# Patient Record
Sex: FEMALE | NOT HISPANIC OR LATINO | URBAN - METROPOLITAN AREA
[De-identification: names, ages, dates, MRNs, and addresses within clinical notes are randomized per-mention and may not be internally consistent; named-entity substitution may affect disease eponyms.]

---

## 2017-01-03 ENCOUNTER — COMMUNICATION - HEALTHEAST (OUTPATIENT)
Dept: FAMILY MEDICINE | Facility: CLINIC | Age: 62
End: 2017-01-03

## 2017-01-03 DIAGNOSIS — M79.7 FIBROMYALGIA: ICD-10-CM

## 2017-01-24 ENCOUNTER — COMMUNICATION - HEALTHEAST (OUTPATIENT)
Dept: FAMILY MEDICINE | Facility: CLINIC | Age: 62
End: 2017-01-24

## 2017-01-27 ENCOUNTER — COMMUNICATION - HEALTHEAST (OUTPATIENT)
Dept: FAMILY MEDICINE | Facility: CLINIC | Age: 62
End: 2017-01-27

## 2017-01-27 DIAGNOSIS — M79.7 FIBROMYALGIA: ICD-10-CM

## 2017-03-06 ENCOUNTER — COMMUNICATION - HEALTHEAST (OUTPATIENT)
Dept: ADMINISTRATIVE | Facility: CLINIC | Age: 62
End: 2017-03-06

## 2017-03-07 ENCOUNTER — COMMUNICATION - HEALTHEAST (OUTPATIENT)
Dept: FAMILY MEDICINE | Facility: CLINIC | Age: 62
End: 2017-03-07

## 2017-03-07 DIAGNOSIS — M79.7 FIBROMYALGIA: ICD-10-CM

## 2017-03-09 ENCOUNTER — COMMUNICATION - HEALTHEAST (OUTPATIENT)
Dept: FAMILY MEDICINE | Facility: CLINIC | Age: 62
End: 2017-03-09

## 2017-03-27 ENCOUNTER — AMBULATORY - HEALTHEAST (OUTPATIENT)
Dept: LAB | Facility: CLINIC | Age: 62
End: 2017-03-27

## 2017-03-27 DIAGNOSIS — M13.0 POLYARTHRITIS OF HAND: ICD-10-CM

## 2017-03-27 DIAGNOSIS — M25.50 ARTHRALGIA OF MULTIPLE SITES, BILATERAL: ICD-10-CM

## 2017-03-27 LAB
ALT SERPL W P-5'-P-CCNC: 19 U/L (ref 0–45)
CREAT SERPL-MCNC: 0.66 MG/DL (ref 0.6–1.1)
GFR SERPL CREATININE-BSD FRML MDRD: >60 ML/MIN/1.73M2

## 2017-03-29 ENCOUNTER — RECORDS - HEALTHEAST (OUTPATIENT)
Dept: GENERAL RADIOLOGY | Age: 62
End: 2017-03-29

## 2017-03-29 ENCOUNTER — OFFICE VISIT - HEALTHEAST (OUTPATIENT)
Dept: RHEUMATOLOGY | Facility: CLINIC | Age: 62
End: 2017-03-29

## 2017-03-29 ENCOUNTER — AMBULATORY - HEALTHEAST (OUTPATIENT)
Dept: RHEUMATOLOGY | Facility: CLINIC | Age: 62
End: 2017-03-29

## 2017-03-29 DIAGNOSIS — G89.29 CHRONIC PAIN OF LEFT KNEE: ICD-10-CM

## 2017-03-29 DIAGNOSIS — G89.29 OTHER CHRONIC PAIN: ICD-10-CM

## 2017-03-29 DIAGNOSIS — M25.562 CHRONIC PAIN OF LEFT KNEE: ICD-10-CM

## 2017-03-29 DIAGNOSIS — M13.0 POLYARTHRITIS OF HAND: ICD-10-CM

## 2017-03-29 DIAGNOSIS — M25.562 PAIN IN LEFT KNEE: ICD-10-CM

## 2017-03-29 DIAGNOSIS — M25.50 ARTHRALGIA OF MULTIPLE SITES, BILATERAL: ICD-10-CM

## 2017-03-29 ASSESSMENT — MIFFLIN-ST. JEOR: SCORE: 1221.28

## 2017-04-04 ENCOUNTER — COMMUNICATION - HEALTHEAST (OUTPATIENT)
Dept: FAMILY MEDICINE | Facility: CLINIC | Age: 62
End: 2017-04-04

## 2017-04-06 ENCOUNTER — RECORDS - HEALTHEAST (OUTPATIENT)
Dept: ADMINISTRATIVE | Facility: OTHER | Age: 62
End: 2017-04-06

## 2017-05-24 ENCOUNTER — RECORDS - HEALTHEAST (OUTPATIENT)
Dept: ADMINISTRATIVE | Facility: OTHER | Age: 62
End: 2017-05-24

## 2017-05-25 ENCOUNTER — COMMUNICATION - HEALTHEAST (OUTPATIENT)
Dept: RHEUMATOLOGY | Facility: CLINIC | Age: 62
End: 2017-05-25

## 2017-05-25 DIAGNOSIS — M25.50 ARTHRALGIA OF MULTIPLE SITES, BILATERAL: ICD-10-CM

## 2017-05-25 DIAGNOSIS — M13.0 POLYARTHRITIS OF HAND: ICD-10-CM

## 2017-05-31 ENCOUNTER — OFFICE VISIT - HEALTHEAST (OUTPATIENT)
Dept: RHEUMATOLOGY | Facility: CLINIC | Age: 62
End: 2017-05-31

## 2017-05-31 DIAGNOSIS — M06.00 SERONEGATIVE RHEUMATOID ARTHRITIS (H): ICD-10-CM

## 2017-05-31 DIAGNOSIS — R74.01 ALT (SGPT) LEVEL RAISED: ICD-10-CM

## 2017-05-31 DIAGNOSIS — M25.50 POLYARTHRALGIA: ICD-10-CM

## 2017-05-31 ASSESSMENT — MIFFLIN-ST. JEOR: SCORE: 1221.28

## 2017-06-01 ENCOUNTER — COMMUNICATION - HEALTHEAST (OUTPATIENT)
Dept: FAMILY MEDICINE | Facility: CLINIC | Age: 62
End: 2017-06-01

## 2017-06-01 DIAGNOSIS — E78.5 HYPERLIPIDEMIA: ICD-10-CM

## 2017-06-14 ENCOUNTER — RECORDS - HEALTHEAST (OUTPATIENT)
Dept: ADMINISTRATIVE | Facility: OTHER | Age: 62
End: 2017-06-14

## 2017-06-14 ENCOUNTER — COMMUNICATION - HEALTHEAST (OUTPATIENT)
Dept: ADMINISTRATIVE | Facility: CLINIC | Age: 62
End: 2017-06-14

## 2017-06-14 DIAGNOSIS — M06.00 SERONEGATIVE RHEUMATOID ARTHRITIS (H): ICD-10-CM

## 2017-06-14 DIAGNOSIS — R74.01 ALT (SGPT) LEVEL RAISED: ICD-10-CM

## 2017-07-17 ENCOUNTER — COMMUNICATION - HEALTHEAST (OUTPATIENT)
Dept: RHEUMATOLOGY | Facility: CLINIC | Age: 62
End: 2017-07-17

## 2017-07-17 DIAGNOSIS — M06.00 SERONEGATIVE RHEUMATOID ARTHRITIS (H): ICD-10-CM

## 2017-07-17 DIAGNOSIS — M25.50 ARTHRALGIA OF MULTIPLE SITES, BILATERAL: ICD-10-CM

## 2017-07-17 DIAGNOSIS — M13.0 POLYARTHRITIS OF HAND: ICD-10-CM

## 2017-07-21 ENCOUNTER — RECORDS - HEALTHEAST (OUTPATIENT)
Dept: ADMINISTRATIVE | Facility: OTHER | Age: 62
End: 2017-07-21

## 2017-07-28 ENCOUNTER — RECORDS - HEALTHEAST (OUTPATIENT)
Dept: ADMINISTRATIVE | Facility: OTHER | Age: 62
End: 2017-07-28

## 2017-08-02 ENCOUNTER — COMMUNICATION - HEALTHEAST (OUTPATIENT)
Dept: RHEUMATOLOGY | Facility: CLINIC | Age: 62
End: 2017-08-02

## 2017-08-02 ENCOUNTER — OFFICE VISIT - HEALTHEAST (OUTPATIENT)
Dept: RHEUMATOLOGY | Facility: CLINIC | Age: 62
End: 2017-08-02

## 2017-08-02 DIAGNOSIS — M06.00 SERONEGATIVE RHEUMATOID ARTHRITIS (H): ICD-10-CM

## 2017-08-02 DIAGNOSIS — M25.50 ARTHRALGIA OF MULTIPLE SITES, BILATERAL: ICD-10-CM

## 2017-08-02 DIAGNOSIS — M25.562 CHRONIC PAIN OF LEFT KNEE: ICD-10-CM

## 2017-08-02 DIAGNOSIS — G89.29 CHRONIC PAIN OF LEFT KNEE: ICD-10-CM

## 2017-08-29 ENCOUNTER — RECORDS - HEALTHEAST (OUTPATIENT)
Dept: ADMINISTRATIVE | Facility: OTHER | Age: 62
End: 2017-08-29

## 2017-09-19 ENCOUNTER — COMMUNICATION - HEALTHEAST (OUTPATIENT)
Dept: RHEUMATOLOGY | Facility: CLINIC | Age: 62
End: 2017-09-19

## 2017-09-19 DIAGNOSIS — M13.0 POLYARTHRITIS OF HAND: ICD-10-CM

## 2017-10-03 ENCOUNTER — COMMUNICATION - HEALTHEAST (OUTPATIENT)
Dept: ADMINISTRATIVE | Facility: CLINIC | Age: 62
End: 2017-10-03

## 2017-10-03 DIAGNOSIS — M06.00 SERONEGATIVE RHEUMATOID ARTHRITIS (H): ICD-10-CM

## 2017-10-03 DIAGNOSIS — M25.50 ARTHRALGIA OF MULTIPLE SITES, BILATERAL: ICD-10-CM

## 2017-10-05 ENCOUNTER — COMMUNICATION - HEALTHEAST (OUTPATIENT)
Dept: RHEUMATOLOGY | Facility: CLINIC | Age: 62
End: 2017-10-05

## 2017-10-05 DIAGNOSIS — M13.0 POLYARTHRITIS OF HAND: ICD-10-CM

## 2017-10-05 DIAGNOSIS — M25.50 ARTHRALGIA OF MULTIPLE SITES, BILATERAL: ICD-10-CM

## 2017-10-20 ENCOUNTER — RECORDS - HEALTHEAST (OUTPATIENT)
Dept: ADMINISTRATIVE | Facility: OTHER | Age: 62
End: 2017-10-20

## 2017-11-08 ENCOUNTER — OFFICE VISIT - HEALTHEAST (OUTPATIENT)
Dept: RHEUMATOLOGY | Facility: CLINIC | Age: 62
End: 2017-11-08

## 2017-11-08 DIAGNOSIS — M06.00 SERONEGATIVE RHEUMATOID ARTHRITIS (H): ICD-10-CM

## 2017-11-08 DIAGNOSIS — R74.01 ALT (SGPT) LEVEL RAISED: ICD-10-CM

## 2017-11-08 DIAGNOSIS — M13.0 POLYARTHRITIS OF HAND: ICD-10-CM

## 2017-11-08 ASSESSMENT — MIFFLIN-ST. JEOR: SCORE: 1212.2

## 2017-11-18 ENCOUNTER — COMMUNICATION - HEALTHEAST (OUTPATIENT)
Dept: FAMILY MEDICINE | Facility: CLINIC | Age: 62
End: 2017-11-18

## 2017-11-18 DIAGNOSIS — M79.7 FIBROMYALGIA: ICD-10-CM

## 2017-11-19 ENCOUNTER — COMMUNICATION - HEALTHEAST (OUTPATIENT)
Dept: FAMILY MEDICINE | Facility: CLINIC | Age: 62
End: 2017-11-19

## 2017-11-19 DIAGNOSIS — E78.5 HYPERLIPIDEMIA: ICD-10-CM

## 2017-11-23 ENCOUNTER — AMBULATORY - HEALTHEAST (OUTPATIENT)
Dept: FAMILY MEDICINE | Facility: CLINIC | Age: 62
End: 2017-11-23

## 2017-11-23 DIAGNOSIS — Z12.31 VISIT FOR SCREENING MAMMOGRAM: ICD-10-CM

## 2017-11-23 DIAGNOSIS — Z12.11 SCREENING FOR COLON CANCER: ICD-10-CM

## 2017-12-11 ENCOUNTER — RECORDS - HEALTHEAST (OUTPATIENT)
Dept: ADMINISTRATIVE | Facility: OTHER | Age: 62
End: 2017-12-11

## 2017-12-12 ENCOUNTER — COMMUNICATION - HEALTHEAST (OUTPATIENT)
Dept: FAMILY MEDICINE | Facility: CLINIC | Age: 62
End: 2017-12-12

## 2017-12-12 DIAGNOSIS — E78.5 HYPERLIPIDEMIA: ICD-10-CM

## 2017-12-16 ENCOUNTER — COMMUNICATION - HEALTHEAST (OUTPATIENT)
Dept: FAMILY MEDICINE | Facility: CLINIC | Age: 62
End: 2017-12-16

## 2017-12-16 DIAGNOSIS — I10 ESSENTIAL HYPERTENSION: ICD-10-CM

## 2018-01-12 ENCOUNTER — COMMUNICATION - HEALTHEAST (OUTPATIENT)
Dept: FAMILY MEDICINE | Facility: CLINIC | Age: 63
End: 2018-01-12

## 2018-01-12 DIAGNOSIS — M79.7 FIBROMYALGIA: ICD-10-CM

## 2018-02-09 ENCOUNTER — COMMUNICATION - HEALTHEAST (OUTPATIENT)
Dept: FAMILY MEDICINE | Facility: CLINIC | Age: 63
End: 2018-02-09

## 2018-02-09 DIAGNOSIS — M79.7 FIBROMYALGIA: ICD-10-CM

## 2018-03-21 ENCOUNTER — COMMUNICATION - HEALTHEAST (OUTPATIENT)
Dept: ADMINISTRATIVE | Facility: CLINIC | Age: 63
End: 2018-03-21

## 2018-03-21 DIAGNOSIS — M06.00 SERONEGATIVE RHEUMATOID ARTHRITIS (H): ICD-10-CM

## 2018-03-21 DIAGNOSIS — M13.0 POLYARTHRITIS OF HAND: ICD-10-CM

## 2018-03-28 ENCOUNTER — COMMUNICATION - HEALTHEAST (OUTPATIENT)
Dept: FAMILY MEDICINE | Facility: CLINIC | Age: 63
End: 2018-03-28

## 2018-03-28 ENCOUNTER — RECORDS - HEALTHEAST (OUTPATIENT)
Dept: ADMINISTRATIVE | Facility: OTHER | Age: 63
End: 2018-03-28

## 2021-05-28 ENCOUNTER — RECORDS - HEALTHEAST (OUTPATIENT)
Dept: ADMINISTRATIVE | Facility: CLINIC | Age: 66
End: 2021-05-28

## 2021-05-29 ENCOUNTER — RECORDS - HEALTHEAST (OUTPATIENT)
Dept: ADMINISTRATIVE | Facility: CLINIC | Age: 66
End: 2021-05-29

## 2021-05-30 ENCOUNTER — RECORDS - HEALTHEAST (OUTPATIENT)
Dept: ADMINISTRATIVE | Facility: CLINIC | Age: 66
End: 2021-05-30

## 2021-05-30 VITALS — BODY MASS INDEX: 24.99 KG/M2 | HEIGHT: 65 IN | WEIGHT: 150 LBS

## 2021-05-31 ENCOUNTER — RECORDS - HEALTHEAST (OUTPATIENT)
Dept: ADMINISTRATIVE | Facility: CLINIC | Age: 66
End: 2021-05-31

## 2021-05-31 VITALS — HEIGHT: 65 IN | BODY MASS INDEX: 24.99 KG/M2 | WEIGHT: 150 LBS

## 2021-05-31 VITALS — HEIGHT: 65 IN | WEIGHT: 148 LBS | BODY MASS INDEX: 24.66 KG/M2

## 2021-05-31 VITALS — BODY MASS INDEX: 24.63 KG/M2 | WEIGHT: 148 LBS

## 2021-06-09 NOTE — PROGRESS NOTES
"ASSESSMENT AND PLAN:  Rachael Robertson 61 y.o. female is in for follow-up for osteoarthritis.  She has ample evidence of inflammatory arthropathy.  She has tolerated methotrexate.  Increase the dose to 20 mg.  Taper prednisone.  Management principles of osteoarthritis reviewed.  Return for follow-up in 2 months labs every 4 week.    Diagnoses and all orders for this visit:    Polyarthritis of hand  -     predniSONE (DELTASONE) 2.5 MG tablet; Take 7.5 mg/d prednisone PO for 1 month, reduce to 5 mg/d for the next month, and then reduce to 2.5 mg/d for the third month and then stop.  Dispense: 90 tablet; Refill: 2  -     methotrexate 2.5 MG tablet; Take 8 tablets (20 mg total) by mouth once a week.  Dispense: 32 tablet; Refill: 1  -     folic acid (FOLVITE) 1 MG tablet; Take 1 tablet (1 mg total) by mouth daily.  Dispense: 30 tablet; Refill: 11  -     XR Knee Left 1 or 2 VWS; Future; Expected date: 3/29/17    Arthralgia of multiple sites, bilateral  -     methotrexate 2.5 MG tablet; Take 8 tablets (20 mg total) by mouth once a week.  Dispense: 32 tablet; Refill: 1    Chronic pain of left knee  -     XR Knee Left 1 or 2 VWS; Future; Expected date: 3/29/17    HISTORY OF PRESENTING ILLNESS:  Rachael Robertson 61 y.o. female is here for follow-up of seronegative inflammatory polyarthritis.  She has begun to tolerate the current treatment regimen.  Her pain is improved compared to the previous visit.  She has residual symptoms.  We talked about the side effects.  She has noted:none.  Recent labs reviewed: Within acceptable range.  There are no associated new symptoms.  Inquiry into the extra-articular features reveals: None .he past 5 years.  Progressive.  Intermittently she gets swollen hands and fingers especially pointing to the PIPs and MCPs but more so on the left side.  She then had a fall.  Injured her left second MCP area.  She was operated on by orthopedics and had \"synovial tissue removed: Normal.  She reports that " "ibuprofen has done some help for her.  She is finding it increasingly difficult to do such activities such as buttoning her grandchildren.  She has difficult time taking her rings off especially in the mornings and there is puffiness around the PIPs.  In addition she has as she puts several other joints which have been troublesome.  She had an injury and resulted in subscapularis tendon evulsion on the left side.  She has rotator cuff damage to the right side and been told that her remedy is a reverse arthroplasty and.  She had her right knee replaced in 2008.  She has had 2 lumbar surgeries for disc disease.  She is a nonsmoker alcohol couple drinks per week.  In 1996 recalls she was seen at [facility] fibromyalgia was diagnosed she's been on current medication including duloxetine since a long time.  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  She has strong family history of rheumatoid arthritis in mother and sister.  Sister has gout as well.  She rates her pain as \"severe\" a 0.0/10.  She noted ongoing back pain yet.  As she does not have history of psoriasis ulcerative colitis or Crohn's disease.  Besides the right knee surgery she has had right shoulder surgery for rotator cuff, and this was in 2011 and then in 2013.  2016 left subscapularis detachment surgery was done.  She had left foot surgery for \"crooked\".  Recently she injured her left ring finger hyperextending it.  She has hypertension and history of cataracts.  For further historical information including review of systems, ADL limitations please review the assessment/plan section and the multidimensional health assessment questionnaire from today's visit which is reviewed and attached in the EMR.    ALLERGIES:Review of patient's allergies indicates no known allergies.    PAST MEDICAL/ACTIVE PROBLEMS/MEDICATION/ FAMILY HISTORY/SOCIAL DATA:  The patient has a family history of  No past medical " "history on file.  History   Smoking Status     Never Smoker   Smokeless Tobacco     Never Used     Patient Active Problem List   Diagnosis     Osteopenia     Massive Tear Of The Right Rotator Cuff Tendon, s/p repair 9/30/16     Hypertension     Fibromyalgia     Hyperlipidemia     Allergic rhinitis     Arthralgia of multiple sites, bilateral     Polyarthritis of hand     Postcoital UTI     Current Outpatient Prescriptions   Medication Sig Dispense Refill     DULoxetine (CYMBALTA) 60 MG capsule TAKE 1 CAPSULE (60 MG TOTAL) BY MOUTH DAILY. 90 capsule 3     gabapentin (NEURONTIN) 300 MG capsule TAKE ONE CAPSULE BY MOUTH DAILY AT BEDTIME 90 capsule 2     hydroCHLOROthiazide (HYDRODIURIL) 25 MG tablet TAKE 1 TABLET (25 MG TOTAL) BY MOUTH DAILY. 90 tablet 3     oxyCODONE (ROXICODONE) 5 MG immediate release tablet   0     pilocarpine (SALAGEN) 2.5 mg Take 10 mg by mouth daily.       RESTASIS 0.05 % ophthalmic emulsion   1     simvastatin (ZOCOR) 20 MG tablet TAKE 1 TABLET (20 MG TOTAL) BY MOUTH BEDTIME. 90 tablet 3     sulfamethoxazole-trimethoprim (BACTRIM DS) 800-160 mg per tablet Take one tablet as needed for postcoital UTI prevention 30 tablet 0     methotrexate 2.5 MG tablet Take 4 tablets (10 mg total) by mouth once a week. 16 tablet 0     No current facility-administered medications for this visit.      DETAILED EXAMINATION (six area) :  Vitals:    03/29/17 1111   BP: 108/58   Weight: 150 lb (68 kg)   Height: 5' 5\" (1.651 m)     Alert oriented. Head including the face is examined for malar rash, heliotropes, scarring, lupus pernio. Eyes examined for redness such as in episcleritis/scleritis, periorbital lesions.   Neck examined  for lymph nodes, range of motion Both upper and lower extremities (all four) examined for swollen, warm &/or  tender joints, range of motion, rash, muscle weakness, edema. The salient normal / abnormal findings are appended.      On examination she has residual swelling of the MCPs especially " the #2 on left hand without tenderness.     LAB / IMAGING DATA:  ALT   Date Value Ref Range Status   03/27/2017 19 0 - 45 U/L Final   07/28/2016 23 0 - 45 U/L Final   05/29/2015 33 0 - 45 U/L Final     Albumin   Date Value Ref Range Status   03/27/2017 4.5 3.5 - 5.0 g/dL Final   07/28/2016 4.2 3.5 - 5.0 g/dL Final   05/29/2015 4.1 3.5 - 5.0 g/dL Final     Creatinine   Date Value Ref Range Status   03/27/2017 0.66 0.60 - 1.10 mg/dL Final   09/19/2016 0.76 0.60 - 1.10 mg/dL Final   07/28/2016 0.73 0.60 - 1.10 mg/dL Final       WBC   Date Value Ref Range Status   03/27/2017 6.7 4.0 - 11.0 thou/uL Final   10/17/2016 5.7 4.0 - 11.0 thou/uL Final   04/02/2015 4.7 4.0 - 11.0 thou/uL Final   09/09/2014 4.1 4.0 - 11.0 thou/uL Final     Hemoglobin   Date Value Ref Range Status   03/27/2017 13.8 12.0 - 16.0 g/dL Final   10/17/2016 12.6 12.0 - 16.0 g/dL Final   09/19/2016 13.9 12.0 - 16.0 g/dL Final     Platelets   Date Value Ref Range Status   03/27/2017 373 140 - 440 thou/uL Final   10/17/2016 399 140 - 440 thou/uL Final   09/19/2016 363 140 - 440 thou/uL Final       Lab Results   Component Value Date    RF 19.8 07/28/2016    SEDRATE 12 07/28/2016

## 2021-06-11 NOTE — PROGRESS NOTES
ASSESSMENT AND PLAN:  Rachael Robertson 61 y.o. female is in for follow-up for inflammatory polyarthritis which is now requiring a morphology of seronegative rheumatoid arthritis in the background of osteoarthritis.  While she feels things have improved with methotrexate 8 tablets per week which she splits for in the morning for in the evening, with folic acid she does have residual inflammation such as a synovitis in the left hand particularly the second MCP.  Furthermore her recent labs done in [place] show that her liver function studies have gotten worse.  On 427 she had an ALT of 37 which was elevated part that labs reference, and now 93 on 524.  I am unable to add sulfasalazine as I had planned.  One could even have to consider starting reducing the methotrexate.  We will need to modify Replens.  She is going to stay on prednisone 5 mg instead of going to 2.5.  We will check her labs today.  If these are better/within acceptable range continue methotrexate and add sulfasalazine in which case she will check her labs every 2 weeks ×2.  If on the other hand the liver functions have deteriorated she may have to hold methotrexate and recheck the labs and also consider tumor necrosis factor inhibitors.    Diagnoses and all orders for this visit:    Seronegative rheumatoid arthritis  -     predniSONE (DELTASONE) 5 MG tablet; Take 1 tablet (5 mg total) by mouth daily.  Dispense: 30 tablet; Refill: 1  -     QTF-Mycobacterium tuberculosis by [facility]-TB Gold    ALT (SGPT) level raised    Polyarthralgia      HISTORY OF PRESENTING ILLNESS:  Rachael Robertson 61 y.o. female is here for follow-up of seronegative rheumatoid arthritis.  She has moderately severe pain such as in her shoulders especially the right side where she had arthroplasty.  She noted milder pain in her hands.  She noted overall pain level 5.0/10.  This is interfering with a variety of day-to-day activities.  There is little in the way of morning stiffness.   "She has been able to tolerate methotrexate nicely.  She is down to 5 mg of prednisone changing over to 2.5 starting tomorrow.  Recent labs were done elsewhere and a copy is attached along with the chart the ALT is worsening now at 93, previously 37 which is also noted to be high for that lab.  Continue do think is currently swollen is to take something with you she has begun to tolerate the current treatment regimen.  Her pain is improved compared to the previous visit.  She has residual symptoms.  We talked about the side effects.  She has noted:none.  Recent labs reviewed: Within acceptable range.  There are no associated new symptoms.  Inquiry into the extra-articular features reveals: None .he past 5 years.  Progressive.  Intermittently she gets swollen hands and fingers especially pointing to the PIPs and MCPs but more so on the left side.  She then had a fall.  Injured her left second MCP area.  She was operated on by orthopedics and had \"synovial tissue removed: Normal.  She reports that ibuprofen has done some help for her.  She is finding it increasingly difficult to do such activities such as buttoning her grandchildren.  She has difficult time taking her rings off especially in the mornings and there is puffiness around the PIPs.  In addition she has as she puts several other joints which have been troublesome.  She had an injury and resulted in subscapularis tendon evulsion on the left side.  She has rotator cuff damage to the right side and been told that her remedy is a reverse arthroplasty and.  She had her right knee replaced in 2008.  She has had 2 lumbar surgeries for disc disease.  She is a nonsmoker alcohol couple drinks per week.  In 1996 recalls she was seen at [facility] fibromyalgia was diagnosed she's been on current medication including duloxetine since a long time.  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  She has strong family " "history of rheumatoid arthritis in mother and sister.  Sister has gout as well.  She rates her pain as \"severe\" a 0.0/10.  She noted ongoing back pain yet.  As she does not have history of psoriasis ulcerative colitis or Crohn's disease.  Besides the right knee surgery she has had right shoulder surgery for rotator cuff, and this was in 2011 and then in 2013.  2016 left subscapularis detachment surgery was done.  She had left foot surgery for \"crooked\".  Recently she injured her left ring finger hyperextending it.  She has hypertension and history of cataracts.  For further historical information including review of systems, ADL limitations please review the assessment/plan section and the multidimensional health assessment questionnaire from today's visit which is reviewed and attached in the EMR.    ALLERGIES:Review of patient's allergies indicates no known allergies.    PAST MEDICAL/ACTIVE PROBLEMS/MEDICATION/ FAMILY HISTORY/SOCIAL DATA:  The patient has a family history of  No past medical history on file.  History   Smoking Status     Never Smoker   Smokeless Tobacco     Never Used     Patient Active Problem List   Diagnosis     Osteopenia     Massive Tear Of The Right Rotator Cuff Tendon, s/p repair 9/30/16     Hypertension     Fibromyalgia     Hyperlipidemia     Allergic rhinitis     Arthralgia of multiple sites, bilateral     Polyarthritis of hand     Postcoital UTI     Chronic pain of left knee     Current Outpatient Prescriptions   Medication Sig Dispense Refill     DULoxetine (CYMBALTA) 60 MG capsule TAKE 1 CAPSULE (60 MG TOTAL) BY MOUTH DAILY. 90 capsule 3     folic acid (FOLVITE) 1 MG tablet Take 1 tablet (1 mg total) by mouth daily. 30 tablet 11     gabapentin (NEURONTIN) 300 MG capsule TAKE ONE CAPSULE BY MOUTH DAILY AT BEDTIME 90 capsule 2     hydroCHLOROthiazide (HYDRODIURIL) 25 MG tablet TAKE 1 TABLET (25 MG TOTAL) BY MOUTH DAILY. 90 tablet 3     methotrexate 2.5 MG tablet TAKE 8 TABLETS (20 MG " "TOTAL) BY MOUTH ONCE A WEEK. 32 tablet 1     oxyCODONE (ROXICODONE) 5 MG immediate release tablet   0     pilocarpine (SALAGEN) 2.5 mg Take 10 mg by mouth daily.       predniSONE (DELTASONE) 2.5 MG tablet Take 7.5 mg/d prednisone PO for 1 month, reduce to 5 mg/d for the next month, and then reduce to 2.5 mg/d for the third month and then stop. 90 tablet 2     RESTASIS 0.05 % ophthalmic emulsion   1     simvastatin (ZOCOR) 20 MG tablet TAKE 1 TABLET (20 MG TOTAL) BY MOUTH BEDTIME. 90 tablet 3     sulfamethoxazole-trimethoprim (BACTRIM DS) 800-160 mg per tablet Take one tablet as needed for postcoital UTI prevention 30 tablet 0     No current facility-administered medications for this visit.      DETAILED EXAMINATION (six area) :  Vitals:    05/31/17 1526   BP: 100/58   Patient Site: Right Arm   Patient Position: Sitting   Cuff Size: Adult Regular   Pulse: 72   Weight: 150 lb (68 kg)   Height: 5' 5\" (1.651 m)     Alert oriented. Head including the face is examined for malar rash, heliotropes, scarring, lupus pernio. Eyes examined for redness such as in episcleritis/scleritis, periorbital lesions.   Neck examined  for lymph nodes, range of motion Both upper and lower extremities (all four) examined for swollen, warm &/or  tender joints, range of motion, rash, muscle weakness, edema. The salient normal / abnormal findings are appended.  She has persistent swelling and tenderness of her left hand small joints especially the left second MCP, she now has a boutonniere deformity of the left index finger.       LAB / IMAGING DATA:  ALT   Date Value Ref Range Status   03/27/2017 19 0 - 45 U/L Final   07/28/2016 23 0 - 45 U/L Final   05/29/2015 33 0 - 45 U/L Final     Albumin   Date Value Ref Range Status   03/27/2017 4.5 3.5 - 5.0 g/dL Final   07/28/2016 4.2 3.5 - 5.0 g/dL Final   05/29/2015 4.1 3.5 - 5.0 g/dL Final     Creatinine   Date Value Ref Range Status   03/27/2017 0.66 0.60 - 1.10 mg/dL Final   09/19/2016 0.76 0.60 - " 1.10 mg/dL Final   07/28/2016 0.73 0.60 - 1.10 mg/dL Final       WBC   Date Value Ref Range Status   03/27/2017 6.7 4.0 - 11.0 thou/uL Final   10/17/2016 5.7 4.0 - 11.0 thou/uL Final   04/02/2015 4.7 4.0 - 11.0 thou/uL Final   09/09/2014 4.1 4.0 - 11.0 thou/uL Final     Hemoglobin   Date Value Ref Range Status   03/27/2017 13.8 12.0 - 16.0 g/dL Final   10/17/2016 12.6 12.0 - 16.0 g/dL Final   09/19/2016 13.9 12.0 - 16.0 g/dL Final     Platelets   Date Value Ref Range Status   03/27/2017 373 140 - 440 thou/uL Final   10/17/2016 399 140 - 440 thou/uL Final   09/19/2016 363 140 - 440 thou/uL Final       Lab Results   Component Value Date    RF 19.8 07/28/2016    SEDRATE 12 07/28/2016

## 2021-06-12 NOTE — PROGRESS NOTES
ASSESSMENT AND PLAN:  Rachael Robertson 61 y.o. female is here for follow-up of seronegative rheumatoid arthritis, osteoarthritis, history of marked abnormalities and liver functions studies.  These are now normal.  This was done at lab elsewhere copy attached in the chart.  She has ongoing inflammation.  This is most prominent in her left hand epicentered at the second metacarpophalangeal joints.  We talked about possibility of local injection she wants to proceed 10 mg of methylprednisolone and Marcaine under ultrasound guidance injected.  On a previous visit we could not add further medication because of the liver issues that those as noted resolved.  I have asked her to add sulfasalazine 500 mg twice daily and gradually increase it to 1.5 g twice daily.  Prescription for this is given.  This is going to be in addition to the methotrexate.  She is on folic acid.  I have asked her to return for follow-up here at this time in 3 months labs every 4 week.  Once she stabilizes will then do labs at less frequent intervals.      Diagnoses and all orders for this visit:    Seronegative rheumatoid arthritis  -     sulfaSALAzine (AZULFIDINE) 500 mg tablet; 500 mg twice daily for 10 days, increase to a gram twice daily for the next 10 days, and then 1.5 mg twice daily.  Dispense: 180 tablet; Refill: 1  -     methylPREDNISolone acetate injection 10 mg (DEPO-MEDROL); Inject 0.25 mL (10 mg total) into the joint once.    Arthralgia of multiple sites, bilateral  -     sulfaSALAzine (AZULFIDINE) 500 mg tablet; 500 mg twice daily for 10 days, increase to a gram twice daily for the next 10 days, and then 1.5 mg twice daily.  Dispense: 180 tablet; Refill: 1  -     methylPREDNISolone acetate injection 10 mg (DEPO-MEDROL); Inject 0.25 mL (10 mg total) into the joint once.    Chronic pain of left knee      HISTORY OF PRESENTING ILLNESS:  Rachael Robertson 61 y.o. female is here for follow-up of seronegative rheumatoid arthritis.  She has  "moderately severe pain such as in her left hand.  She rates it at 8.0/10.  This is epicentered of the left index finger metacarpophalangeal joint.  Her knee pain has resolved nearly completely with corticosteroid injection.  She had marked elevation of liver function studies which have not improved back to normal now.  Recent labs reviewed these were done elsewhere.  Copy attached in the chart.  This is interfering with a variety of day-to-day activities.  T her morning stiffness is 2-3 hours.  She has been able to tolerate methotrexate nicely.  She is on 2.5 mg of prednisone which she is going to discontinue after a month.      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  She has strong family history of rheumatoid arthritis in mother and sister.  Sister has gout as well.  As she does not have history of psoriasis ulcerative colitis or Crohn's disease.  Besides the right knee surgery she has had right shoulder surgery for rotator cuff, and this was in 2011 and then in 2013.  2016 left subscapularis detachment surgery was done.  She had left foot surgery for \"crooked\".  Recently she injured her left ring finger hyperextending it.  She has hypertension and history of cataracts.  For further historical information including review of systems, ADL limitations please review the assessment/plan section and the multidimensional health assessment questionnaire from today's visit which is reviewed and attached in the EMR.          Following is the excerpt from a recent note: Recent labs were done elsewhere and a copy is attached along with the chart the ALT is worsening now at 93, previously 37 which is also noted to be high for that lab.  Continue do think is currently swollen is to take something with you she has begun to tolerate the current treatment regimen.  Her pain is improved compared to the previous visit.  She has residual symptoms.  We talked about the side " "effects.  She has noted:none.  Recent labs reviewed: Within acceptable range.  There are no associated new symptoms.  Inquiry into the extra-articular features reveals: None .he past 5 years.  Progressive.  Intermittently she gets swollen hands and fingers especially pointing to the PIPs and MCPs but more so on the left side.  She then had a fall.  Injured her left second MCP area.  She was operated on by orthopedics and had \"synovial tissue removed: Normal.  She reports that ibuprofen has done some help for her.  She is finding it increasingly difficult to do such activities such as buttoning her grandchildren.  She has difficult time taking her rings off especially in the mornings and there is puffiness around the PIPs.  In addition she has as she puts several other joints which have been troublesome.  She had an injury and resulted in subscapularis tendon evulsion on the left side.  She has rotator cuff damage to the right side and been told that her remedy is a reverse arthroplasty and.  She had her right knee replaced in 2008.  She has had 2 lumbar surgeries for disc disease.  She is a nonsmoker alcohol couple drinks per week.  In 1996 recalls she was seen at [facility] fibromyalgia was diagnosed she's been on current medication including duloxetine since a long time.ALLERGIES:Review of patient's allergies indicates no known allergies.    PAST MEDICAL/ACTIVE PROBLEMS/MEDICATION/ FAMILY HISTORY/SOCIAL DATA:  The patient has a family history of  No past medical history on file.  History   Smoking Status     Never Smoker   Smokeless Tobacco     Never Used     Patient Active Problem List   Diagnosis     Osteopenia     Massive Tear Of The Right Rotator Cuff Tendon, s/p repair 9/30/16     Hypertension     Fibromyalgia     Hyperlipidemia     Allergic rhinitis     Arthralgia of multiple sites, bilateral     Polyarthritis of hand     Postcoital UTI     Chronic pain of left knee     Seronegative rheumatoid arthritis     " ALT (SGPT) level raised     Polyarthralgia     Current Outpatient Prescriptions   Medication Sig Dispense Refill     DULoxetine (CYMBALTA) 60 MG capsule TAKE 1 CAPSULE (60 MG TOTAL) BY MOUTH DAILY. 90 capsule 3     folic acid (FOLVITE) 1 MG tablet Take 1 tablet (1 mg total) by mouth daily. 30 tablet 11     gabapentin (NEURONTIN) 300 MG capsule TAKE ONE CAPSULE BY MOUTH DAILY AT BEDTIME 90 capsule 2     hydroCHLOROthiazide (HYDRODIURIL) 25 MG tablet TAKE 1 TABLET (25 MG TOTAL) BY MOUTH DAILY. 90 tablet 3     methotrexate 2.5 MG tablet Take 8 tablets (20 mg total) by mouth once a week. 32 tablet 1     oxyCODONE (ROXICODONE) 5 MG immediate release tablet   0     pilocarpine (SALAGEN) 2.5 mg Take 10 mg by mouth daily.       RESTASIS 0.05 % ophthalmic emulsion   1     simvastatin (ZOCOR) 20 MG tablet TAKE 1 TABLET (20 MG TOTAL) BY MOUTH AT BEDTIME. 90 tablet 1     sulfamethoxazole-trimethoprim (BACTRIM DS) 800-160 mg per tablet Take one tablet as needed for postcoital UTI prevention 30 tablet 0     No current facility-administered medications for this visit.      DETAILED EXAMINATION (six area) :  There were no vitals filed for this visit.  Alert oriented. Head including the face is examined for malar rash, heliotropes, scarring, lupus pernio. Eyes examined for redness such as in episcleritis/scleritis, periorbital lesions.   Neck examined  for lymph nodes, range of motion Both upper and lower extremities (all four) examined for swollen, warm &/or  tender joints, range of motion, rash, muscle weakness, edema. The salient normal / abnormal findings are appended.  She has synovitis of the left second metacarpophalangeal joint with swelling tenderness and warmth.  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  Mild JLT of the knees.     LAB / IMAGING DATA:  ALT   Date Value Ref Range Status   03/27/2017 19 0 - 45 U/L Final   07/28/2016 23 0 - 45 U/L Final   05/29/2015 33 0 - 45 U/L Final     Albumin   Date Value  Ref Range Status   03/27/2017 4.5 3.5 - 5.0 g/dL Final   07/28/2016 4.2 3.5 - 5.0 g/dL Final   05/29/2015 4.1 3.5 - 5.0 g/dL Final     Creatinine   Date Value Ref Range Status   03/27/2017 0.66 0.60 - 1.10 mg/dL Final   09/19/2016 0.76 0.60 - 1.10 mg/dL Final   07/28/2016 0.73 0.60 - 1.10 mg/dL Final       WBC   Date Value Ref Range Status   03/27/2017 6.7 4.0 - 11.0 thou/uL Final   10/17/2016 5.7 4.0 - 11.0 thou/uL Final   04/02/2015 4.7 4.0 - 11.0 thou/uL Final   09/09/2014 4.1 4.0 - 11.0 thou/uL Final     Hemoglobin   Date Value Ref Range Status   03/27/2017 13.8 12.0 - 16.0 g/dL Final   10/17/2016 12.6 12.0 - 16.0 g/dL Final   09/19/2016 13.9 12.0 - 16.0 g/dL Final     Platelets   Date Value Ref Range Status   03/27/2017 373 140 - 440 thou/uL Final   10/17/2016 399 140 - 440 thou/uL Final   09/19/2016 363 140 - 440 thou/uL Final       Lab Results   Component Value Date    RF 19.8 07/28/2016    SEDRATE 12 07/28/2016

## 2021-06-14 NOTE — PROGRESS NOTES
"ASSESSMENT AND PLAN:  Rachael Robertson 62 y.o. female has seronegative rheumatoid arthritis, osteoarthritis, persistently abnormal liver function studies.  I have asked her to discontinue methotrexate and sulfasalazine.  She brings the labs that were drawn in [place].  Her insurance does not cover care in [place].  She had wondered if she continued to follow-up here.  I have outlined to her that given how she is going to require multiple visits and indeed an MRI of her left hand today was recommended, it may be a better option for her to consider having a rheumatologist locally in [place].  Therefore the MRI of the left hand is been canceled.  To help \"bridge\" her over the next few weeks and low-dose prednisone is been prescribed.  She may well not be a candidate for Biologics given recurrent persistent liver function issues.  We discussed how those can be associated with the hepatic toxicity rarely though.  Recommended she check her liver function studies in 3 weeks.  Should this still be abnormal she would then follow-up with her primary physician locally in [place] which she now has established care with.    Diagnoses and all orders for this visit:    Seronegative rheumatoid arthritis  -     Discontinue: predniSONE (DELTASONE) 2.5 MG tablet; Take 7.5 mg/d prednisone PO for 1 month, reduce to 5 mg/d for the next month, and then reduce to 2.5 mg/d for the third month and then stop.  Dispense: 90 tablet; Refill: 2  -     predniSONE (DELTASONE) 2.5 MG tablet; Take 7.5 mg/d prednisone PO for 1 month, reduce to 5 mg/d for the next month, and then reduce to 2.5 mg/d for the third month and then stop.  Dispense: 90 tablet; Refill: 2    Polyarthritis of hand    ALT (SGPT) level raised      HISTORY OF PRESENTING ILLNESS:  Rachael Robertson 62 y.o. female is here for follow-up of seronegative rheumatoid arthritis.  She is moved out of state of [place].  She reports that her hands have been hurting more.  Especially the left " "side.  On her previous visit the second MCP was injected with corticosteroid which has continued to provide her with good relief.  But of late is been beginning to hurt again.  Rates as moderately severe.  The right hand mildly so.  She has pain in the wrists bilaterally and elbows.  She has noted difficulty performing a variety of day-to-day activities.  She had her labs drawn and her ALT is even worse now at 130.  That labs reference ranges from 10-35.  Her creatinine, CBC was within acceptable range.  She is on methotrexate and sulfasalazine.  She is taking folic acid.  She has noted stiffness in the morning for an hour.  There is no fever blurry vision eye redness mouth was a nausea she has had no rash.  She recently had some cough.   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  She has strong family history of rheumatoid arthritis in mother and sister.  Sister has gout as well.  As she does not have history of psoriasis ulcerative colitis or Crohn's disease.  Besides the right knee surgery she has had right shoulder surgery for rotator cuff, and this was in 2011 and then in 2013.  2016 left subscapularis detachment surgery was done.  She had left foot surgery for \"crooked\".  Recently she injured her left ring finger hyperextending it.  She has hypertension and history of cataracts.  For further historical information including review of systems, ADL limitations please review the assessment/plan section and the multidimensional health assessment questionnaire from today's visit which is reviewed and attached in the EMR.    Following is the excerpt from a recent note: Recent labs were done elsewhere and a copy is attached along with the chart the ALT is worsening now at 93, previously 37 which is also noted to be high for that lab.  Continue do think is currently swollen is to take something with you she has begun to tolerate the current treatment regimen.  Her " "pain is improved compared to the previous visit.  She has residual symptoms.  We talked about the side effects.  She has noted:none.  Recent labs reviewed: Within acceptable range.  There are no associated new symptoms.  Inquiry into the extra-articular features reveals: None .he past 5 years.  Progressive.  Intermittently she gets swollen hands and fingers especially pointing to the PIPs and MCPs but more so on the left side.  She then had a fall.  Injured her left second MCP area.  She was operated on by orthopedics and had \"synovial tissue removed: Normal.  She reports that ibuprofen has done some help for her.  She is finding it increasingly difficult to do such activities such as buttoning her grandchildren.  She has difficult time taking her rings off especially in the mornings and there is puffiness around the PIPs.  In addition she has as she puts several other joints which have been troublesome.  She had an injury and resulted in subscapularis tendon evulsion on the left side.  She has rotator cuff damage to the right side and been told that her remedy is a reverse arthroplasty and.  She had her right knee replaced in 2008.  She has had 2 lumbar surgeries for disc disease.  She is a nonsmoker alcohol couple drinks per week.  In 1996 recalls she was seen at [facility] fibromyalgia was diagnosed she's been on current medication including duloxetine since a long time.ALLERGIES:Review of patient's allergies indicates no known allergies.    PAST MEDICAL/ACTIVE PROBLEMS/MEDICATION/ FAMILY HISTORY/SOCIAL DATA:  The patient has a family history of  No past medical history on file.  History   Smoking Status     Never Smoker   Smokeless Tobacco     Never Used     Patient Active Problem List   Diagnosis     Osteopenia     Massive Tear Of The Right Rotator Cuff Tendon, s/p repair 9/30/16     Hypertension     Fibromyalgia     Hyperlipidemia     Allergic rhinitis     Arthralgia of multiple sites, bilateral     " "Polyarthritis of hand     Postcoital UTI     Chronic pain of left knee     Seronegative rheumatoid arthritis     ALT (SGPT) level raised     Polyarthralgia     Current Outpatient Prescriptions   Medication Sig Dispense Refill     amoxicillin (AMOXIL) 500 MG capsule Take 500 mg by mouth as needed.       DULoxetine (CYMBALTA) 60 MG capsule TAKE 1 CAPSULE (60 MG TOTAL) BY MOUTH DAILY. 90 capsule 3     folic acid (FOLVITE) 1 MG tablet Take 1 tablet (1 mg total) by mouth daily. 30 tablet 11     gabapentin (NEURONTIN) 300 MG capsule TAKE ONE CAPSULE BY MOUTH DAILY AT BEDTIME 90 capsule 2     hydroCHLOROthiazide (HYDRODIURIL) 25 MG tablet TAKE 1 TABLET (25 MG TOTAL) BY MOUTH DAILY. 90 tablet 3     methotrexate 2.5 MG tablet TAKE 8 TABLETS (20 MG TOTAL) BY MOUTH ONCE A WEEK. 32 tablet 1     pilocarpine (SALAGEN) 2.5 mg Take 10 mg by mouth daily.       RESTASIS 0.05 % ophthalmic emulsion   1     simvastatin (ZOCOR) 20 MG tablet TAKE 1 TABLET (20 MG TOTAL) BY MOUTH AT BEDTIME. 90 tablet 1     sulfaSALAzine (AZULFIDINE) 500 mg tablet 500 mg twice daily for 10 days, increase to a gram twice daily for the next 10 days, and then 1.5 mg twice daily. 180 tablet 1     No current facility-administered medications for this visit.      DETAILED EXAMINATION (six area) :  Vitals:    11/08/17 1112   BP: 138/82   Pulse: 86   Weight: 148 lb (67.1 kg)   Height: 5' 5\" (1.651 m)     Alert oriented. Head including the face is examined for malar rash, heliotropes, scarring, lupus pernio. Eyes examined for redness such as in episcleritis/scleritis, periorbital lesions.   Neck examined  for  range of motion Both upper and lower extremities (all four) examined for swollen, warm &/or  tender joints, range of motion, rash, muscle weakness, edema. The salient normal / abnormal findings are appended.  Synovitis of multiple metacarpophalangeal joints of the left hand, especially the second and third with the deformity and ulnar deviation.   LAB / " IMAGING DATA:  ALT   Date Value Ref Range Status   03/27/2017 19 0 - 45 U/L Final   07/28/2016 23 0 - 45 U/L Final   05/29/2015 33 0 - 45 U/L Final     Albumin   Date Value Ref Range Status   03/27/2017 4.5 3.5 - 5.0 g/dL Final   07/28/2016 4.2 3.5 - 5.0 g/dL Final   05/29/2015 4.1 3.5 - 5.0 g/dL Final     Creatinine   Date Value Ref Range Status   03/27/2017 0.66 0.60 - 1.10 mg/dL Final   09/19/2016 0.76 0.60 - 1.10 mg/dL Final   07/28/2016 0.73 0.60 - 1.10 mg/dL Final       WBC   Date Value Ref Range Status   03/27/2017 6.7 4.0 - 11.0 thou/uL Final   10/17/2016 5.7 4.0 - 11.0 thou/uL Final   04/02/2015 4.7 4.0 - 11.0 thou/uL Final   09/09/2014 4.1 4.0 - 11.0 thou/uL Final     Hemoglobin   Date Value Ref Range Status   03/27/2017 13.8 12.0 - 16.0 g/dL Final   10/17/2016 12.6 12.0 - 16.0 g/dL Final   09/19/2016 13.9 12.0 - 16.0 g/dL Final     Platelets   Date Value Ref Range Status   03/27/2017 373 140 - 440 thou/uL Final   10/17/2016 399 140 - 440 thou/uL Final   09/19/2016 363 140 - 440 thou/uL Final       Lab Results   Component Value Date    RF 19.8 07/28/2016    SEDRATE 12 07/28/2016

## 2021-06-15 PROBLEM — G89.29 CHRONIC PAIN OF LEFT KNEE: Status: ACTIVE | Noted: 2017-03-29

## 2021-06-15 PROBLEM — R74.01 ALT (SGPT) LEVEL RAISED: Status: ACTIVE | Noted: 2017-05-31

## 2021-06-15 PROBLEM — M06.00 SERONEGATIVE RHEUMATOID ARTHRITIS (H): Status: ACTIVE | Noted: 2017-05-31

## 2021-06-15 PROBLEM — M25.562 CHRONIC PAIN OF LEFT KNEE: Status: ACTIVE | Noted: 2017-03-29

## 2021-06-15 PROBLEM — M25.50 POLYARTHRALGIA: Status: ACTIVE | Noted: 2017-05-31

## 2021-07-21 ENCOUNTER — RECORDS - HEALTHEAST (OUTPATIENT)
Dept: ADMINISTRATIVE | Facility: CLINIC | Age: 66
End: 2021-07-21